# Patient Record
Sex: FEMALE | Race: OTHER | HISPANIC OR LATINO
[De-identification: names, ages, dates, MRNs, and addresses within clinical notes are randomized per-mention and may not be internally consistent; named-entity substitution may affect disease eponyms.]

---

## 2019-05-08 ENCOUNTER — FORM ENCOUNTER (OUTPATIENT)
Age: 84
End: 2019-05-08

## 2019-05-09 ENCOUNTER — APPOINTMENT (OUTPATIENT)
Dept: ORTHOPEDIC SURGERY | Facility: CLINIC | Age: 84
End: 2019-05-09
Payer: COMMERCIAL

## 2019-05-09 ENCOUNTER — OUTPATIENT (OUTPATIENT)
Dept: OUTPATIENT SERVICES | Facility: HOSPITAL | Age: 84
LOS: 1 days | End: 2019-05-09
Payer: MEDICARE

## 2019-05-09 VITALS
DIASTOLIC BLOOD PRESSURE: 78 MMHG | HEIGHT: 62 IN | WEIGHT: 151 LBS | SYSTOLIC BLOOD PRESSURE: 110 MMHG | BODY MASS INDEX: 27.79 KG/M2

## 2019-05-09 DIAGNOSIS — M17.12 UNILATERAL PRIMARY OSTEOARTHRITIS, LEFT KNEE: ICD-10-CM

## 2019-05-09 DIAGNOSIS — M25.562 PAIN IN LEFT KNEE: ICD-10-CM

## 2019-05-09 PROCEDURE — 73521 X-RAY EXAM HIPS BI 2 VIEWS: CPT | Mod: 26

## 2019-05-09 PROCEDURE — 99203 OFFICE O/P NEW LOW 30 MIN: CPT

## 2019-05-09 PROCEDURE — 72020 X-RAY EXAM OF SPINE 1 VIEW: CPT | Mod: 26

## 2019-05-09 PROCEDURE — 73564 X-RAY EXAM KNEE 4 OR MORE: CPT | Mod: 26,50

## 2019-05-09 NOTE — PHYSICAL EXAM
[de-identified] : X-ray imaging of the bilateral hips done here today demonstrates mild degenerative changes, bilaterally\par \par X-ray imaging of the bilateral knees done here today demonstrates mild to moderate degenerative changes, most significant in the patellofemoral compartment of the left knee where it is moderately severe with bone on bone involvement of the lateral patellar facet\par \par X-ray imaging of the lumbosacral spine demonstrates severe multilevel degenerative changes with loss of lumbar lordosis and sagittal imbalance\par \par  [de-identified] : Constitutional: Well appearing. No acute distress.\par Mental Status: Alert & oriented to person, place and time. Normal affect.\par Pulmonary: No respiratory distress. Normal chest excursion.\par \par Gait: Stiff, bent over.\par Ambulatory assist devices: None.\par \par Cervical spine: Skin intact. No visible deformity. Painless active ROM without evident restriction.\par Bilateral upper extremities: Skin intact. No deformity. Painless active ROM without evident restriction.\par Thoracolumbar spine: Significant scoliosis and kyphosis.  Minimal tenderness. (+) radicular pain on passive straight leg raise on right.\par \par \par Pelvis: No pelvic obliquity. No tenderness.\par Leg lengths: Equal.\par Bilateral Hips: No swelling or deformity. No focal tenderness. Painless and unrestricted range of motion. No crepitation.\par Right Knee:\par Skin intact.\par No surgical scars.\par No erythema or ecchymosis.\par No swelling or effusion.\par No deformity.\par No focal tenderness.\par Painless ROM from full extension to 135 degrees of flexion.\par Central patellar tracking.\par (+) crepitation.\par No instability.\par \par Left Knee:\par Skin intact.\par No surgical scars.\par No erythema or ecchymosis.\par No swelling or effusion.\par No deformity.\par Mild medial and lateral joint line tenderness.\par Painless ROM from full extension to 130 degrees of flexion.\par Central patellar tracking.\par (+) crepitation.\par No instability.\par \par Neurological: Intact distal crude touch sensation. Normal distal motor power.\par Cardiovascular: Palpable dorsalis pedis and posterior tibialis pulses. Brisk capillary refill. No peripheral edema.\par Lymphatics: No peripheral adenopathy appreciated.

## 2019-05-09 NOTE — END OF VISIT
[FreeTextEntry3] : All medical record entries made by the Scribe were at my, Dr. Vance's, discretion and personally dictated by me on 05/09/2019. I have reviewed the chart and agree that the record accurately reflects my personal performance of the history, physical exam, assessment and plan. I have also personally directed, reviewed and agreed to the chart.

## 2019-05-09 NOTE — DISCUSSION/SUMMARY
[de-identified] : Ms. Troy is an 85 y/o F with pain in the back and left leg. I informed her that based on imaging and physical exam, I believe the majority of her pain originates in the spine. I wrote a physical therapy prescription. I also referred her to spine specialist, Dr. Mueller, for further evaluation and management. She will follow up here as needed.

## 2019-05-09 NOTE — REVIEW OF SYSTEMS
[Sight Problems] : vision problems [Joint Pain] : joint pain [Negative] : Heme/Lymph [FreeTextEntry9] : Back Pain

## 2019-05-09 NOTE — HISTORY OF PRESENT ILLNESS
[de-identified] : 84 year old female with osteoporosis and hyperthyroidism presents today for initial evaluation of left leg pain that began about a year ago. She reports left knee pain and instability on stairs along with moderate stiffness. In the left hip, she reports mild, daily pain that makes it difficult for her to place shoes on the left foot. She also reports an occasional pins and needles sensation in the bilateral calves. Patient can walk 5-10 blocks with a moderate limp and can use stairs with difficulty. She can enter public transportation and sit comfortably in an ordinary chair. Physical therapy and acetaminophen help a little

## 2019-05-28 ENCOUNTER — FORM ENCOUNTER (OUTPATIENT)
Age: 84
End: 2019-05-28

## 2019-05-29 ENCOUNTER — APPOINTMENT (OUTPATIENT)
Dept: ORTHOPEDIC SURGERY | Facility: CLINIC | Age: 84
End: 2019-05-29
Payer: COMMERCIAL

## 2019-05-29 ENCOUNTER — OUTPATIENT (OUTPATIENT)
Dept: OUTPATIENT SERVICES | Facility: HOSPITAL | Age: 84
LOS: 1 days | End: 2019-05-29
Payer: MEDICARE

## 2019-05-29 VITALS
SYSTOLIC BLOOD PRESSURE: 120 MMHG | DIASTOLIC BLOOD PRESSURE: 70 MMHG | OXYGEN SATURATION: 98 % | HEIGHT: 62 IN | WEIGHT: 151 LBS | HEART RATE: 67 BPM | BODY MASS INDEX: 27.79 KG/M2

## 2019-05-29 DIAGNOSIS — M41.50 OTHER SECONDARY SCOLIOSIS, SITE UNSPECIFIED: ICD-10-CM

## 2019-05-29 DIAGNOSIS — M54.42 LUMBAGO WITH SCIATICA, LEFT SIDE: ICD-10-CM

## 2019-05-29 DIAGNOSIS — G89.29 LUMBAGO WITH SCIATICA, LEFT SIDE: ICD-10-CM

## 2019-05-29 PROCEDURE — 97161 PT EVAL LOW COMPLEX 20 MIN: CPT

## 2019-05-29 PROCEDURE — 73521 X-RAY EXAM HIPS BI 2 VIEWS: CPT

## 2019-05-29 PROCEDURE — 73564 X-RAY EXAM KNEE 4 OR MORE: CPT

## 2019-05-29 PROCEDURE — 99215 OFFICE O/P EST HI 40 MIN: CPT

## 2019-05-29 PROCEDURE — 72100 X-RAY EXAM L-S SPINE 2/3 VWS: CPT

## 2019-05-29 PROCEDURE — 72082 X-RAY EXAM ENTIRE SPI 2/3 VW: CPT

## 2019-05-29 PROCEDURE — 72020 X-RAY EXAM OF SPINE 1 VIEW: CPT

## 2019-05-29 PROCEDURE — 72084 X-RAY EXAM ENTIRE SPI 6/> VW: CPT | Mod: 26

## 2019-06-06 PROBLEM — M41.50 DEGENERATIVE SCOLIOSIS IN ADULT PATIENT: Status: ACTIVE | Noted: 2019-05-09

## 2019-06-06 RX ORDER — ALENDRONATE SODIUM 40 MG
40 TABLET ORAL
Refills: 0 | Status: ACTIVE | COMMUNITY

## 2019-06-13 ENCOUNTER — OUTPATIENT (OUTPATIENT)
Dept: OUTPATIENT SERVICES | Facility: HOSPITAL | Age: 84
LOS: 1 days | End: 2019-06-13
Payer: MEDICARE

## 2019-06-13 ENCOUNTER — APPOINTMENT (OUTPATIENT)
Dept: MRI IMAGING | Facility: HOSPITAL | Age: 84
End: 2019-06-13

## 2019-06-13 PROCEDURE — 72148 MRI LUMBAR SPINE W/O DYE: CPT

## 2019-06-13 PROCEDURE — 72148 MRI LUMBAR SPINE W/O DYE: CPT | Mod: 26

## 2019-06-19 ENCOUNTER — APPOINTMENT (OUTPATIENT)
Dept: ORTHOPEDIC SURGERY | Facility: CLINIC | Age: 84
End: 2019-06-19
Payer: COMMERCIAL

## 2019-06-19 DIAGNOSIS — M51.36 OTHER INTERVERTEBRAL DISC DEGENERATION, LUMBAR REGION: ICD-10-CM

## 2019-06-19 DIAGNOSIS — M43.8X9 OTHER SPECIFIED DEFORMING DORSOPATHIES, SITE UNSPECIFIED: ICD-10-CM

## 2019-06-19 PROCEDURE — 99214 OFFICE O/P EST MOD 30 MIN: CPT

## 2019-06-30 PROBLEM — M51.36 DISC DEGENERATION, LUMBAR: Status: ACTIVE | Noted: 2019-06-06

## 2019-06-30 PROBLEM — M43.8X9 SAGITTAL PLANE IMBALANCE: Status: ACTIVE | Noted: 2019-05-09

## 2019-06-30 NOTE — HISTORY OF PRESENT ILLNESS
[de-identified] : Follow Up 6/19/19: Patient continues to have moderate mid and low back pain that radiates laterally down her left thigh to her knee, unchanged from last visit 3 weeks ago. Denies new neurologic symptoms, denies bowel/bladder symptoms. Here to discuss MRI lumbar. \par \par Initial 5/29/19: Ms. POWELL is a very pleasant 84 year old female who complains of low back pain for approximately 3 months, atraumatic. On initial presentation symptoms were as follows: Patient described the pain as intermittent. Patient rated the pain as 3-5/10 severity. The pain localized to mid and lower back. The pain radiates laterally down her left thigh to her knee. Patient denies extremity numbness and paresthesias. The pain is relieved by standing. The pain is worsened by activity and prolonged sitting. Past treatments included pharmacologic management, with mild temporary relief. The patient has had physical therapy without improvement. She has not had steroid injections.\par \par The patient reports no loss of hand dexterity.\par The patient states there no loss of balance when walking.\par There no sensory loss in the arms or legs\par The patent no difficulty with urination.\par \par The patient no history of previous spine surgery.\par The patient no previous spine consultation.\par \par Pain:\par Back 50 Right Leg 0 Left Leg 50\par \par The patient has no history of unexpected weight loss, no history of active cancer, no history bladder or bowel dysfunction, no night pain, no fevers or chills.\par \par The past medical history, surgical history, family history, allergies, medications, 10+ point review of systems, family history and social history were reviewed and non contributory.

## 2019-06-30 NOTE — PHYSICAL EXAM
[de-identified] : General: patient is well developed, well nourished, in no acute \par distress, alert and oriented x 3. \par \par Mood and affect: normal\par \par Respiratory: no respiratory distress noted\par \par Skin: no scars over spine, skin intact, no erythema, increased warmth\par \par Alignment:The spine is well compensated in the coronal and sagittal plane.  There is no asymmetry on the meza forward bend test\par \par Gait: The patient is able to toe walk and heel walk without difficulty. The patient is able to tandem gait without difficulty.\par \par Palpation: no tenderness to palpation spine or paraspinal region\par \par Range of motion: Lumbar spine ROM is restricted\par \par Neurologic Exam:\par Motor: Manual Muscle testing in the lower extremities is 5 out of 5 in all muscle groups. There is no evidence of muscular atrophy in the lower extremities. Sensory: Sensation to light touch is grossly intact in the lower extremities\par \par Reflexes: DTR are present and symmetric throughout, negative bruno bilaterally, negative inverted radial reflex bilaterally, no clonus, plantar responses are flexor\par \par Hip Exam: +ttp left trochanteric bursa; No pain with internal or external rotation of hips bilaterally\par \par Special tests: Straight leg raise test negative.  Cross straight leg test negative.  JAEL test negative\par \par Vascular: Examination of the peripheral vascular system demonstrates no evidence of congestion or edema. no evidence of lymphedema bilateral lower extremities, pulses are present and symmetric in both lower extremities.\par \par  [de-identified] : MRI lumbar 6/13/19: \par \par XR thoracolumbar 5/29/19: moderate/advanced multilevel degenerative changes, 4mm retrolisthesis L2 on L3 and 4mm anterolisthesis L3 on L4 without evidence of dynamic instability, approximately 34 degree levorotatory lumbar scoliosis, osteopenia, no acute fractures. \par \par

## 2019-06-30 NOTE — DISCUSSION/SUMMARY
[de-identified] : pt for lumbar spine/ trochanteric bursitis\par f/u prn\par f/u marlon if pain persists

## 2019-07-07 NOTE — HISTORY OF PRESENT ILLNESS
[de-identified] : Referred by Dr. Vance\par \par Ms. POWELL is a very pleasant 84 year old female who complains of low back pain for approximately 3 months, atraumatic. On initial presentation symptoms were as follows: Patient described the pain as intermittent.  Patient rated the pain as 3-5/10 severity.  The pain localized to mid and lower back.  The pain radiates laterally down her left thigh to her knee.  Patient  denies extremity numbness and paresthesias. The pain is relieved by standing.  The pain is worsened by activity and prolonged sitting. Past treatments included pharmacologic management, with mild temporary relief. The patient has had physical therapy without improvement. She has not had steroid injections.\par \par The patient reports no loss of hand dexterity.\par The patient states there no loss of balance when walking.\par There no sensory loss in the arms or legs\par The patent no difficulty with urination.\par \par The patient no history of previous spine surgery.\par The patient no previous spine consultation.\par \par Pain:\par Back 50 Right Leg 0 Left Leg 50\par \par The patient has no history of unexpected weight loss, no history of active cancer, no history bladder or bowel dysfunction, no night pain, no fevers or chills.\par \par The past medical history, surgical history, family history, allergies, medications, 10+ point review of systems, family history and social history were reviewed and non contributory.\par \par

## 2019-07-07 NOTE — PHYSICAL EXAM
[de-identified] : General: patient is well developed, well nourished, in no acute \par distress, alert and oriented x 3. \par \par Mood and affect: normal\par \par Respiratory: no respiratory distress noted\par \par Skin: no scars over spine, skin intact, no erythema, increased warmth\par \par Alignment:The spine is well compensated in the coronal and sagittal plane.  There is no asymmetry on the meza forward bend test\par \par Gait: The patient is able to toe walk and heel walk without difficulty. The patient is able to tandem gait without difficulty.\par \par Palpation: no tenderness to palpation spine or paraspinal region\par \par Range of motion: Lumbar spine ROM is restricted\par \par Neurologic Exam:\par Motor: Manual Muscle testing in the lower extremities is 5 out of 5 in all muscle groups. There is no evidence of muscular atrophy in the lower extremities. Sensory: Sensation to light touch is grossly intact in the lower extremities\par \par Reflexes: DTR are present and symmetric throughout, negative bruno bilaterally, negative inverted radial reflex bilaterally, no clonus, plantar responses are flexor\par \par Hip Exam: No pain with internal or external rotation of hips bilaterally\par \par Special tests: Straight leg raise test negative.  Cross straight leg test negative.  JAEL test negative\par \par Vascular: Examination of the peripheral vascular system demonstrates no evidence of congestion or edema. no evidence of lymphedema bilateral lower extremities, pulses are present and symmetric in both lower extremities.\par \par  [de-identified] : XR thoracolumbar 5/29/19: moderate/advanced multilevel degenerative changes, 4mm retrolisthesis L2 on L3 and 4mm anterolisthesis L3 on L4 without evidence of dynamic instability, approximately 34 degree levorotatory lumbar scoliosis, osteopenia, no acute fractures

## 2019-07-07 NOTE — DISCUSSION/SUMMARY
[de-identified] : Discussed the results of the patient's history, physical exam, and imaging.  Ms. Troy has been suffering from low back pain that radiates laterally to her left thigh for the past 3 months. She has failed oral NSAIDs and physical therapy. Physical and neurologic exams are normal. I am recommending an MRi lumbar without contrast for further evaluation. The patient will follow up with me after imaging is completed, sooner if there is an issue.  All questions were answered.\par \par

## 2020-02-18 ENCOUNTER — APPOINTMENT (OUTPATIENT)
Dept: OTOLARYNGOLOGY | Facility: CLINIC | Age: 85
End: 2020-02-18

## 2020-03-05 ENCOUNTER — APPOINTMENT (OUTPATIENT)
Dept: OTOLARYNGOLOGY | Facility: CLINIC | Age: 85
End: 2020-03-05
Payer: MEDICARE

## 2020-03-05 VITALS
SYSTOLIC BLOOD PRESSURE: 144 MMHG | HEIGHT: 62 IN | DIASTOLIC BLOOD PRESSURE: 77 MMHG | BODY MASS INDEX: 28.08 KG/M2 | TEMPERATURE: 98.2 F | WEIGHT: 152.6 LBS | HEART RATE: 67 BPM

## 2020-03-05 DIAGNOSIS — H93.13 TINNITUS, BILATERAL: ICD-10-CM

## 2020-03-05 DIAGNOSIS — H91.93 UNSPECIFIED HEARING LOSS, BILATERAL: ICD-10-CM

## 2020-03-05 PROCEDURE — 92550 TYMPANOMETRY & REFLEX THRESH: CPT

## 2020-03-05 PROCEDURE — 92557 COMPREHENSIVE HEARING TEST: CPT

## 2020-03-05 PROCEDURE — 99204 OFFICE O/P NEW MOD 45 MIN: CPT

## 2020-03-05 NOTE — ASSESSMENT
[FreeTextEntry1] : 84F here for initial evaluation. She c/o diminished hearing in both ears. There is also nonpulsatile tinnitus, most apparent in quiet environments. No otorrhea, otalgia or vertigo. Audiogram from today shows symmetric mild to severe SNHL. Exam is unremarkable.\par Hearing loss due to age-related changes; tinnitus due to hearing loss. Offered hearing aid trial, which she will think about. RTO 1-2 years for ear exam.

## 2020-03-05 NOTE — HISTORY OF PRESENT ILLNESS
[de-identified] : 84F here for initial evaluation.\par \par She c/o diminished hearing in both ears. There is also nonpulsatile ringing/buzzing noise in both ears, most apparent in quiet environments. No otorrhea, otalgia or vertigo.\par \par Audiogram from today:\par symmetric mild to severe SNHL. R SRT 35, discrim 90%; L SRT 40, discrim 100%. type A tymps\par \par ROS otherwise unremarkable.

## 2020-10-26 ENCOUNTER — OUTPATIENT (OUTPATIENT)
Dept: OUTPATIENT SERVICES | Facility: HOSPITAL | Age: 85
LOS: 1 days | End: 2020-10-26

## 2020-10-26 ENCOUNTER — NON-APPOINTMENT (OUTPATIENT)
Age: 85
End: 2020-10-26

## 2020-10-26 ENCOUNTER — APPOINTMENT (OUTPATIENT)
Dept: OPHTHALMOLOGY | Facility: CLINIC | Age: 85
End: 2020-10-26
Payer: MEDICARE

## 2020-10-26 DIAGNOSIS — Z00.00 ENCOUNTER FOR GENERAL ADULT MEDICAL EXAMINATION WITHOUT ABNORMAL FINDINGS: ICD-10-CM

## 2020-10-26 LAB
BASOPHILS # BLD AUTO: 0.07 K/UL — SIGNIFICANT CHANGE UP (ref 0–0.2)
BASOPHILS NFR BLD AUTO: 1.1 % — SIGNIFICANT CHANGE UP (ref 0–2)
EOSINOPHIL # BLD AUTO: 0.21 K/UL — SIGNIFICANT CHANGE UP (ref 0–0.5)
EOSINOPHIL NFR BLD AUTO: 3.3 % — SIGNIFICANT CHANGE UP (ref 0–6)
ERYTHROCYTE [SEDIMENTATION RATE] IN BLOOD: 7 MM/HR — SIGNIFICANT CHANGE UP
HCT VFR BLD CALC: 45.7 % — HIGH (ref 34.5–45)
HGB BLD-MCNC: 14.6 G/DL — SIGNIFICANT CHANGE UP (ref 11.5–15.5)
IMM GRANULOCYTES NFR BLD AUTO: 0.3 % — SIGNIFICANT CHANGE UP (ref 0–1.5)
LYMPHOCYTES # BLD AUTO: 1.55 K/UL — SIGNIFICANT CHANGE UP (ref 1–3.3)
LYMPHOCYTES # BLD AUTO: 24.7 % — SIGNIFICANT CHANGE UP (ref 13–44)
MCHC RBC-ENTMCNC: 29.9 PG — SIGNIFICANT CHANGE UP (ref 27–34)
MCHC RBC-ENTMCNC: 31.9 GM/DL — LOW (ref 32–36)
MCV RBC AUTO: 93.6 FL — SIGNIFICANT CHANGE UP (ref 80–100)
MONOCYTES # BLD AUTO: 0.62 K/UL — SIGNIFICANT CHANGE UP (ref 0–0.9)
MONOCYTES NFR BLD AUTO: 9.9 % — SIGNIFICANT CHANGE UP (ref 2–14)
NEUTROPHILS # BLD AUTO: 3.81 K/UL — SIGNIFICANT CHANGE UP (ref 1.8–7.4)
NEUTROPHILS NFR BLD AUTO: 60.7 % — SIGNIFICANT CHANGE UP (ref 43–77)
NRBC # BLD: 0 /100 WBCS — SIGNIFICANT CHANGE UP (ref 0–0)
PLATELET # BLD AUTO: 235 K/UL — SIGNIFICANT CHANGE UP (ref 150–400)
RBC # BLD: 4.88 M/UL — SIGNIFICANT CHANGE UP (ref 3.8–5.2)
RBC # FLD: 13.4 % — SIGNIFICANT CHANGE UP (ref 10.3–14.5)
WBC # BLD: 6.28 K/UL — SIGNIFICANT CHANGE UP (ref 3.8–10.5)
WBC # FLD AUTO: 6.28 K/UL — SIGNIFICANT CHANGE UP (ref 3.8–10.5)

## 2020-10-26 PROCEDURE — 99204 OFFICE O/P NEW MOD 45 MIN: CPT

## 2020-10-26 PROCEDURE — 92133 CPTRZD OPH DX IMG PST SGM ON: CPT

## 2020-10-26 PROCEDURE — 99072 ADDL SUPL MATRL&STAF TM PHE: CPT

## 2020-10-27 LAB — CRP SERPL-MCNC: 0.19 MG/DL — SIGNIFICANT CHANGE UP (ref 0–0.4)

## 2021-12-21 ENCOUNTER — APPOINTMENT (OUTPATIENT)
Dept: NEPHROLOGY | Facility: CLINIC | Age: 86
End: 2021-12-21

## 2022-02-16 ENCOUNTER — EMERGENCY (EMERGENCY)
Facility: HOSPITAL | Age: 87
LOS: 1 days | Discharge: HOME | End: 2022-02-16
Attending: EMERGENCY MEDICINE | Admitting: EMERGENCY MEDICINE
Payer: MEDICARE

## 2022-02-16 VITALS
HEART RATE: 66 BPM | TEMPERATURE: 98 F | OXYGEN SATURATION: 96 % | HEIGHT: 64 IN | DIASTOLIC BLOOD PRESSURE: 74 MMHG | WEIGHT: 151.9 LBS | RESPIRATION RATE: 18 BRPM | SYSTOLIC BLOOD PRESSURE: 148 MMHG

## 2022-02-16 DIAGNOSIS — M25.562 PAIN IN LEFT KNEE: ICD-10-CM

## 2022-02-16 DIAGNOSIS — M79.652 PAIN IN LEFT THIGH: ICD-10-CM

## 2022-02-16 DIAGNOSIS — M25.552 PAIN IN LEFT HIP: ICD-10-CM

## 2022-02-16 DIAGNOSIS — E03.9 HYPOTHYROIDISM, UNSPECIFIED: ICD-10-CM

## 2022-02-16 DIAGNOSIS — M79.662 PAIN IN LEFT LOWER LEG: ICD-10-CM

## 2022-02-16 PROCEDURE — 73552 X-RAY EXAM OF FEMUR 2/>: CPT | Mod: 26,LT

## 2022-02-16 PROCEDURE — 73502 X-RAY EXAM HIP UNI 2-3 VIEWS: CPT | Mod: 26

## 2022-02-16 PROCEDURE — 73502 X-RAY EXAM HIP UNI 2-3 VIEWS: CPT

## 2022-02-16 PROCEDURE — 73562 X-RAY EXAM OF KNEE 3: CPT

## 2022-02-16 PROCEDURE — 99284 EMERGENCY DEPT VISIT MOD MDM: CPT | Mod: 25

## 2022-02-16 PROCEDURE — 73502 X-RAY EXAM HIP UNI 2-3 VIEWS: CPT | Mod: 26,LT

## 2022-02-16 PROCEDURE — 73552 X-RAY EXAM OF FEMUR 2/>: CPT

## 2022-02-16 PROCEDURE — 73562 X-RAY EXAM OF KNEE 3: CPT | Mod: 26,LT

## 2022-02-16 PROCEDURE — 73552 X-RAY EXAM OF FEMUR 2/>: CPT | Mod: 26

## 2022-02-16 PROCEDURE — 73562 X-RAY EXAM OF KNEE 3: CPT | Mod: 26

## 2022-02-16 RX ORDER — ACETAMINOPHEN 500 MG
650 TABLET ORAL ONCE
Refills: 0 | Status: COMPLETED | OUTPATIENT
Start: 2022-02-16 | End: 2022-02-16

## 2022-02-16 RX ORDER — IBUPROFEN 200 MG
400 TABLET ORAL ONCE
Refills: 0 | Status: COMPLETED | OUTPATIENT
Start: 2022-02-16 | End: 2022-02-16

## 2022-02-16 RX ADMIN — Medication 650 MILLIGRAM(S): at 13:51

## 2022-02-16 RX ADMIN — Medication 400 MILLIGRAM(S): at 13:51

## 2022-02-16 NOTE — ED PROVIDER NOTE - PHYSICAL EXAMINATION
CONSTITUTIONAL: Awake, alert and in no apparent distress.  HEENT: Head is atraumatic. Eyes clear bilaterally, normal EOMI. Airway patent.  CARDIAC: Normal rate, regular rhythm.  Heart sounds S1, S2.   RESPIRATORY: Breath sounds clear and equal bilaterally. no tachypnea, respiratory distress.   GASTROINTESTINAL: Abdomen soft, non-tender, no guarding, distension.  MUSCULOSKELETAL: Spine appears normal, no midline spinal tenderness, range of motion is not limited, no muscle or joint tenderness. no bony tenderness.   NEUROLOGICAL: Alert, no focal deficits, no motor or sensory deficits.  SKIN: Skin normal color for race, warm, dry and intact. No evidence of rash.  PSYCHIATRIC: Normal mood and affect. no apparent risk to self or others. CONSTITUTIONAL: Awake, alert and in no apparent distress.  HEENT: Head is atraumatic. Eyes clear bilaterally, normal EOMI. Airway patent.  RESPIRATORY no tachypnea, respiratory distress.   GASTROINTESTINAL: Abdomen soft, non-tender, no guarding, distension.  MUSCULOSKELETAL: Spine appears normal, no midline spinal tenderness, range of motion is not limited, no muscle or joint tenderness. no bony tenderness. no swelling to lower extremities, discoloration  NEUROLOGICAL: Alert, no focal deficits, no motor or sensory deficits.  SKIN: Skin normal color for race, warm, dry and intact. No evidence of rash.  PSYCHIATRIC: Normal mood and affect. no apparent risk to self or others. CONSTITUTIONAL: Awake, alert and in no apparent distress.  HEENT: Head is atraumatic. Eyes clear bilaterally, normal EOMI. Airway patent.  RESPIRATORY no tachypnea, respiratory distress.   GASTROINTESTINAL: Abdomen soft, non-tender, no guarding, distension.  MUSCULOSKELETAL: Spine appears normal, no midline spinal tenderness, range of motion is not limited, no muscle or joint tenderness. no bony tenderness. no swelling to lower extremities, discoloration, calf asymmetry, tenderness.  NEUROLOGICAL: Alert, no focal deficits, no motor or sensory deficits.  SKIN: Skin normal color for race, warm, dry and intact. No evidence of rash.  PSYCHIATRIC: Normal mood and affect. no apparent risk to self or others.

## 2022-02-16 NOTE — ED PROVIDER NOTE - WR ORDER NAME 2
Keep your intake of Vitamin K Regular. The highest amount of vitamin K is found in green and leafy vegetables like broccoli, lettuces, cabbage and spinach. You can eat these foods however keep the amount the same as changes in the amount you eat can affect your INR blood tests. Contact your doctor before making any major changes in your diet.    Limit your alcohol intake.
Xray Femur 2 Views, Left

## 2022-02-16 NOTE — ED PROVIDER NOTE - OBJECTIVE STATEMENT
85 yo hx of hypothyroidism presenting w LLE pain to L hip and L thigh and L knee, worse at night. no assoc swelling, numbness, weakness of left lower extremity. Still able to ambulate, denies falls. 85 yo hx of hypothyroidism presenting w LLE pain to L hip and L thigh and L knee, worse at night. no assoc swelling, numbness, weakness of left lower extremity. Still able to ambulate, denies falls, sob, chest pain, pelvic pain, abd pain or any other acute complaints. Took tylenol yesterday which helped symptoms. Normally very active. 87 yo hx of hypothyroidism presenting w LLE pain to L hip, L thigh and L knee, worse at night. no assoc swelling, numbness, weakness of left lower extremity. Still able to ambulate, denies falls, sob, chest pain, pelvic pain, abd pain or any other acute complaints. Took tylenol yesterday which helped symptoms. Normally very active.

## 2022-02-16 NOTE — ED PROVIDER NOTE - PROGRESS NOTE DETAILS
improved after tylenol/ibuprofen, ambulating, continue tylenol every 6 hours ?OA, will fu with pmd, strict return prec given.

## 2022-02-16 NOTE — ED ADULT TRIAGE NOTE - CHIEF COMPLAINT QUOTE
Pt complains of left leg pain from hip to calf since yesterday morning. pt denies any fall/trauma or swelling to area.

## 2022-02-16 NOTE — ED PROVIDER NOTE - IV ALTEPLASE EXCL REL HIDDEN
show Pt BIBEMS from [facility], Per EMS staff called 911 d/t Pt intoxicated and aggressive with other residents telling them she was going to give them covid, Pt violent toward EMS but calm now.  Pt admits to drinking 1.5 liters of vodka today.

## 2022-02-16 NOTE — ED PROVIDER NOTE - CLINICAL SUMMARY MEDICAL DECISION MAKING FREE TEXT BOX
Pain to hip and LLE  Ddx: OA/eval for fx, less likely DVT given no swelling, no jean-pierre pain/discoloration Pain to hip and LLE  Ddx: OA/eval for fx, less likely DVT given no swelling, no calf pain/discoloration

## 2022-02-16 NOTE — ED PROVIDER NOTE - NSFOLLOWUPCLINICS_GEN_ALL_ED_FT
[facility]  Family Medicine  [address]  [place], [place] [address]  Phone: [phone]  Fax:

## 2022-02-16 NOTE — ED PROVIDER NOTE - PATIENT PORTAL LINK FT
You can access the FollowMyHealth Patient Portal offered by [facility] by registering at the following website: [email or web address]. By joining [employer]’s FollowMyHealth portal, you will also be able to view your health information using other applications (apps) compatible with our system.

## 2022-02-25 ENCOUNTER — APPOINTMENT (OUTPATIENT)
Dept: NEUROLOGY | Facility: CLINIC | Age: 87
End: 2022-02-25

## 2022-09-27 NOTE — ADDENDUM
[FreeTextEntry1] : This note was written by Selina Williamson on 05/09/2019 acting as scribe for Dr. Vance and CHUYITA Carrasco. Quality 155 (Denominator): Falls Plan Of Care: Plan of Care not Documented, Reason not Otherwise Specified Quality 226: Preventive Care And Screening: Tobacco Use: Screening And Cessation Intervention: Patient screened for tobacco use and is an ex/non-smoker Detail Level: Detailed Quality 154 Part A: Falls: Risk Assessment (Should Be Reported With Measure 155.): Falls risk assessment completed and documented in the past 12 months. Quality 154 Part B: Falls: Risk Screening (Should Be Reported With Measure 155.): Patient screened for future fall risk; documentation of no falls in the past year or only one fall without injury in the past year Quality 110: Preventive Care And Screening: Influenza Immunization: Influenza Immunization previously received during influenza season Quality 47: Advance Care Plan: Advance care planning not documented, reason not otherwise specified. Quality 431: Preventive Care And Screening: Unhealthy Alcohol Use - Screening: Patient not identified as an unhealthy alcohol user when screened for unhealthy alcohol use using a systematic screening method Quality 402: Tobacco Use And Help With Quitting Among Adolescents: Patient screened for tobacco and never smoked

## 2023-12-20 NOTE — ED ADULT TRIAGE NOTE - NSTRIAGECARE_GEN_A_ER
Continued Stay SW/CM Assessment/Plan of Care Note     Progress note:  New recommendation placed for therapy 5 times or more per week/DONATO placement.     SW met with patient bedside this morning. Conversation focused on DC plans. SW discussed the recommendation for subacute placement at discharge. Patient was in agreement. [facility]/Medicare choice listing provided. Patient states she is likely interested in [facility], [facility] or [facility]. Referrals e-faxed to all three facilities. Patient will discuss this with her spouse today as well.     SW will follow-up with patient once referral responses are received.     Add: 12:09 Call received from Liat at [facility]. They can not accept d/t bed availability.     Add: 2:46pm Call placed to [facility] and case discussed with Muriel. The facility is reviewing the referral and will f/u with writer.     Communication sent to Manda at [facility] as well.     BEBO Gonzales  Cell phone [phone]  Weekends please call [phone]      See ESTER/DK flowsheets for other objective data.    Disposition Recommendations:  Preliminary discharge destination:    SW/CM recommendation for discharge: Home, Home therapy    Discharge Plan/Needs:     Continued Care and Services - Admitted Since 12/18/2023    Coordination has not been started for this encounter.           Prior To Hospitalization:    Living Situation: Spouse and residing at House    .  Support Systems: None, Family members   Home Devices/Equipment: None            Mobility Assist Devices: None, Standard walker   Type of Service Prior to Hospitalization: None               Patient/Family discharge goal (s):  Home therapy, Home     Resources provided:           Therapy Recommendations for Discharge:   PT:      Last Filed Values         Value Time User    PT Discharge Needs  therapy 5 or more times per week 12/19/2023  5:16 PM Uli Poole, PT          OT:       Last  Filed Values         Value Time User    OT Discharge Needs  therapy 5 or more times per week 12/19/2023  3:38 PM Aline Lechuga, OT          SLP:    Last Filed Values       None            Mobility Equipment Recommended for Discharge: patient has 2ww, 4ww      Barriers to Discharge  Identified Barriers to Discharge/Transition Planning:                     Face Mask
